# Patient Record
Sex: MALE | Race: WHITE | NOT HISPANIC OR LATINO | Employment: FULL TIME | URBAN - METROPOLITAN AREA
[De-identification: names, ages, dates, MRNs, and addresses within clinical notes are randomized per-mention and may not be internally consistent; named-entity substitution may affect disease eponyms.]

---

## 2023-03-10 LAB — SARS-COV-2 RESULT: NOT DETECTED

## 2023-09-19 LAB — SARS-COV-2 RESULT: DETECTED

## 2023-10-05 ENCOUNTER — ANCILLARY PROCEDURE (OUTPATIENT)
Dept: RADIOLOGY | Facility: CLINIC | Age: 33
End: 2023-10-05
Payer: COMMERCIAL

## 2023-10-05 DIAGNOSIS — I26.99 PULMONARY EMBOLISM (MULTI): ICD-10-CM

## 2023-10-05 PROCEDURE — 71275 CT ANGIOGRAPHY CHEST: CPT | Performed by: RADIOLOGY

## 2023-10-05 PROCEDURE — 71275 CT ANGIOGRAPHY CHEST: CPT

## 2023-10-05 PROCEDURE — 2550000001 HC RX 255 CONTRASTS: Performed by: PHYSICIAN ASSISTANT

## 2023-10-05 RX ADMIN — IOHEXOL 90 ML: 350 INJECTION, SOLUTION INTRAVENOUS at 11:46

## 2024-01-16 ASSESSMENT — PROMIS GLOBAL HEALTH SCALE
RATE_PHYSICAL_HEALTH: POOR
RATE_GENERAL_HEALTH: FAIR
CARRYOUT_SOCIAL_ACTIVITIES: GOOD
CARRYOUT_PHYSICAL_ACTIVITIES: COMPLETELY
RATE_AVERAGE_PAIN: 3
EMOTIONAL_PROBLEMS: OFTEN
RATE_MENTAL_HEALTH: FAIR
RATE_SOCIAL_SATISFACTION: GOOD
RATE_AVERAGE_FATIGUE: MODERATE
RATE_QUALITY_OF_LIFE: GOOD

## 2024-01-18 ENCOUNTER — LAB (OUTPATIENT)
Dept: LAB | Facility: LAB | Age: 34
End: 2024-01-18
Payer: COMMERCIAL

## 2024-01-18 ENCOUNTER — OFFICE VISIT (OUTPATIENT)
Dept: PRIMARY CARE | Facility: CLINIC | Age: 34
End: 2024-01-18
Payer: COMMERCIAL

## 2024-01-18 VITALS
HEART RATE: 96 BPM | BODY MASS INDEX: 39.17 KG/M2 | WEIGHT: 315 LBS | TEMPERATURE: 97.2 F | DIASTOLIC BLOOD PRESSURE: 82 MMHG | SYSTOLIC BLOOD PRESSURE: 128 MMHG | RESPIRATION RATE: 18 BRPM | HEIGHT: 75 IN

## 2024-01-18 DIAGNOSIS — E66.01 CLASS 3 SEVERE OBESITY DUE TO EXCESS CALORIES WITHOUT SERIOUS COMORBIDITY WITH BODY MASS INDEX (BMI) OF 45.0 TO 49.9 IN ADULT (MULTI): ICD-10-CM

## 2024-01-18 DIAGNOSIS — Z00.00 WELL ADULT EXAM: Primary | ICD-10-CM

## 2024-01-18 DIAGNOSIS — Z00.00 WELL ADULT EXAM: ICD-10-CM

## 2024-01-18 LAB
ALBUMIN SERPL BCP-MCNC: 4.6 G/DL (ref 3.4–5)
ALP SERPL-CCNC: 89 U/L (ref 33–120)
ALT SERPL W P-5'-P-CCNC: 47 U/L (ref 10–52)
ANION GAP SERPL CALC-SCNC: 11 MMOL/L (ref 10–20)
AST SERPL W P-5'-P-CCNC: 27 U/L (ref 9–39)
BASOPHILS # BLD AUTO: 0.03 X10*3/UL (ref 0–0.1)
BASOPHILS NFR BLD AUTO: 0.5 %
BILIRUB SERPL-MCNC: 0.7 MG/DL (ref 0–1.2)
BUN SERPL-MCNC: 13 MG/DL (ref 6–23)
CALCIUM SERPL-MCNC: 9.2 MG/DL (ref 8.6–10.3)
CHLORIDE SERPL-SCNC: 102 MMOL/L (ref 98–107)
CHOLEST SERPL-MCNC: 212 MG/DL (ref 0–199)
CHOLESTEROL/HDL RATIO: 6.6
CO2 SERPL-SCNC: 29 MMOL/L (ref 21–32)
CREAT SERPL-MCNC: 0.95 MG/DL (ref 0.5–1.3)
EGFRCR SERPLBLD CKD-EPI 2021: >90 ML/MIN/1.73M*2
EOSINOPHIL # BLD AUTO: 0.07 X10*3/UL (ref 0–0.7)
EOSINOPHIL NFR BLD AUTO: 1.2 %
ERYTHROCYTE [DISTWIDTH] IN BLOOD BY AUTOMATED COUNT: 12.5 % (ref 11.5–14.5)
EST. AVERAGE GLUCOSE BLD GHB EST-MCNC: 100 MG/DL
GLUCOSE SERPL-MCNC: 102 MG/DL (ref 74–99)
HBA1C MFR BLD: 5.1 %
HCT VFR BLD AUTO: 44.8 % (ref 41–52)
HDLC SERPL-MCNC: 32.3 MG/DL
HGB BLD-MCNC: 15.6 G/DL (ref 13.5–17.5)
IMM GRANULOCYTES # BLD AUTO: 0.02 X10*3/UL (ref 0–0.7)
IMM GRANULOCYTES NFR BLD AUTO: 0.4 % (ref 0–0.9)
INSULIN P FAST SERPL-ACNC: 32 UIU/ML (ref 3–25)
LDLC SERPL CALC-MCNC: 145 MG/DL
LYMPHOCYTES # BLD AUTO: 1.38 X10*3/UL (ref 1.2–4.8)
LYMPHOCYTES NFR BLD AUTO: 24.6 %
MCH RBC QN AUTO: 30.6 PG (ref 26–34)
MCHC RBC AUTO-ENTMCNC: 34.8 G/DL (ref 32–36)
MCV RBC AUTO: 88 FL (ref 80–100)
MONOCYTES # BLD AUTO: 0.45 X10*3/UL (ref 0.1–1)
MONOCYTES NFR BLD AUTO: 8 %
NEUTROPHILS # BLD AUTO: 3.66 X10*3/UL (ref 1.2–7.7)
NEUTROPHILS NFR BLD AUTO: 65.3 %
NON HDL CHOLESTEROL: 180 MG/DL (ref 0–149)
NRBC BLD-RTO: 0 /100 WBCS (ref 0–0)
PLATELET # BLD AUTO: 239 X10*3/UL (ref 150–450)
POTASSIUM SERPL-SCNC: 4.2 MMOL/L (ref 3.5–5.3)
PROT SERPL-MCNC: 7.3 G/DL (ref 6.4–8.2)
RBC # BLD AUTO: 5.1 X10*6/UL (ref 4.5–5.9)
SODIUM SERPL-SCNC: 138 MMOL/L (ref 136–145)
T4 FREE SERPL-MCNC: 1.07 NG/DL (ref 0.61–1.12)
TRIGL SERPL-MCNC: 175 MG/DL (ref 0–149)
TSH SERPL-ACNC: 1.26 MIU/L (ref 0.44–3.98)
VLDL: 35 MG/DL (ref 0–40)
WBC # BLD AUTO: 5.6 X10*3/UL (ref 4.4–11.3)

## 2024-01-18 PROCEDURE — 80061 LIPID PANEL: CPT

## 2024-01-18 PROCEDURE — 83525 ASSAY OF INSULIN: CPT

## 2024-01-18 PROCEDURE — 83036 HEMOGLOBIN GLYCOSYLATED A1C: CPT

## 2024-01-18 PROCEDURE — 99395 PREV VISIT EST AGE 18-39: CPT | Performed by: FAMILY MEDICINE

## 2024-01-18 PROCEDURE — 84443 ASSAY THYROID STIM HORMONE: CPT

## 2024-01-18 PROCEDURE — 36415 COLL VENOUS BLD VENIPUNCTURE: CPT

## 2024-01-18 PROCEDURE — 80053 COMPREHEN METABOLIC PANEL: CPT

## 2024-01-18 PROCEDURE — 84439 ASSAY OF FREE THYROXINE: CPT

## 2024-01-18 PROCEDURE — 85025 COMPLETE CBC W/AUTO DIFF WBC: CPT

## 2024-01-18 ASSESSMENT — ENCOUNTER SYMPTOMS
CARDIOVASCULAR NEGATIVE: 1
GASTROINTESTINAL NEGATIVE: 1
ENDOCRINE NEGATIVE: 1
HEMATOLOGIC/LYMPHATIC NEGATIVE: 1
ALLERGIC/IMMUNOLOGIC NEGATIVE: 1
ACTIVITY CHANGE: 1
UNEXPECTED WEIGHT CHANGE: 1
APPETITE CHANGE: 1
PSYCHIATRIC NEGATIVE: 1
MUSCULOSKELETAL NEGATIVE: 1
RESPIRATORY NEGATIVE: 1
NEUROLOGICAL NEGATIVE: 1
EYES NEGATIVE: 1

## 2024-01-18 NOTE — PROGRESS NOTES
"Subjective   Patient ID: Paramjit Abad is a 33 y.o. male who presents for Annual Exam.    HPI     Review of Systems   Constitutional:  Positive for activity change, appetite change and unexpected weight change.   HENT: Negative.     Eyes: Negative.    Respiratory: Negative.     Cardiovascular: Negative.    Gastrointestinal: Negative.    Endocrine: Negative.    Genitourinary: Negative.    Musculoskeletal: Negative.    Skin: Negative.    Allergic/Immunologic: Negative.    Neurological: Negative.    Hematological: Negative.    Psychiatric/Behavioral: Negative.         Objective   /82   Pulse 96   Temp 36.2 °C (97.2 °F)   Resp 18   Ht 1.905 m (6' 3\")   Wt (!) 176 kg (388 lb)   BMI 48.50 kg/m²     Physical Exam  Vitals and nursing note reviewed.   Constitutional:       Appearance: Normal appearance. He is obese.   HENT:      Head: Normocephalic and atraumatic.      Nose: Nose normal.      Mouth/Throat:      Pharynx: Oropharynx is clear.   Eyes:      Extraocular Movements: Extraocular movements intact.      Conjunctiva/sclera: Conjunctivae normal.      Pupils: Pupils are equal, round, and reactive to light.   Neck:      Vascular: No carotid bruit.   Cardiovascular:      Rate and Rhythm: Normal rate and regular rhythm.      Pulses: Normal pulses.      Heart sounds: Normal heart sounds.   Pulmonary:      Effort: Pulmonary effort is normal. No respiratory distress.      Breath sounds: Normal breath sounds. No wheezing, rhonchi or rales.   Abdominal:      General: Abdomen is flat. Bowel sounds are normal. There is no distension.      Palpations: Abdomen is soft.      Tenderness: There is no abdominal tenderness.      Hernia: No hernia is present.   Musculoskeletal:         General: No swelling or tenderness. Normal range of motion.      Cervical back: Normal range of motion and neck supple. No tenderness.   Lymphadenopathy:      Cervical: No cervical adenopathy.   Skin:     General: Skin is warm and dry.      " Capillary Refill: Capillary refill takes less than 2 seconds.   Neurological:      General: No focal deficit present.      Mental Status: He is alert and oriented to person, place, and time.      Cranial Nerves: No cranial nerve deficit.   Psychiatric:         Attention and Perception: Attention and perception normal.         Mood and Affect: Mood normal.         Behavior: Behavior normal.         Thought Content: Thought content normal.         Judgment: Judgment normal.         Assessment/Plan   Problem List Items Addressed This Visit    None  Visit Diagnoses         Codes    Well adult exam    -  Primary Z00.00    Relevant Orders    Insulin, Fasting    Hemoglobin A1C    CBC and Auto Differential    Comprehensive Metabolic Panel    Lipid Panel    Thyroid Stimulating Hormone    T4, free    Class 3 severe obesity due to excess calories without serious comorbidity with body mass index (BMI) of 45.0 to 49.9 in adult (CMS/[facility])     E66.01, Z68.42    Relevant Orders    Insulin, Fasting    Hemoglobin A1C    CBC and Auto Differential    Comprehensive Metabolic Panel    Lipid Panel    Thyroid Stimulating Hormone    T4, free             Patient reported health Fair    Regular Dental Visits no    Regular Eye Visits no    Hearing Loss no    Balanced Diet no    Weight Concerns yes    Exercise Irregular

## 2024-01-19 ENCOUNTER — DOCUMENTATION (OUTPATIENT)
Dept: INPATIENT UNIT | Facility: HOSPITAL | Age: 34
End: 2024-01-19
Payer: COMMERCIAL

## 2024-01-19 DIAGNOSIS — R73.9 HYPERGLYCEMIA: Primary | ICD-10-CM

## 2024-01-19 DIAGNOSIS — E16.1 HYPERINSULINEMIA: ICD-10-CM

## 2024-01-19 DIAGNOSIS — R73.09 IMPAIRED GLUCOSE METABOLISM: ICD-10-CM

## 2024-01-23 ENCOUNTER — TELEPHONE (OUTPATIENT)
Dept: PRIMARY CARE | Facility: CLINIC | Age: 34
End: 2024-01-23
Payer: COMMERCIAL

## 2024-01-23 DIAGNOSIS — E66.01 CLASS 3 SEVERE OBESITY DUE TO EXCESS CALORIES WITHOUT SERIOUS COMORBIDITY WITH BODY MASS INDEX (BMI) OF 45.0 TO 49.9 IN ADULT (MULTI): Primary | ICD-10-CM

## 2024-01-23 RX ORDER — SEMAGLUTIDE 0.25 MG/.5ML
0.25 INJECTION, SOLUTION SUBCUTANEOUS
Qty: 2 ML | Refills: 0 | Status: SHIPPED | OUTPATIENT
Start: 2024-01-23 | End: 2024-02-14

## 2024-01-23 NOTE — TELEPHONE ENCOUNTER
Patient called in stating that his insurance will not cover the Ozempic due to the diagnosis. Is there another Rx he could try or something we are able to do to help this patient?

## 2024-01-25 DIAGNOSIS — F41.1 GAD (GENERALIZED ANXIETY DISORDER): ICD-10-CM

## 2024-01-25 RX ORDER — ESCITALOPRAM OXALATE 5 MG/1
5 TABLET ORAL NIGHTLY
Qty: 90 TABLET | Refills: 1 | Status: SHIPPED | OUTPATIENT
Start: 2024-01-25

## 2024-02-09 ENCOUNTER — TELEPHONE (OUTPATIENT)
Dept: PRIMARY CARE | Facility: CLINIC | Age: 34
End: 2024-02-09
Payer: COMMERCIAL

## 2024-02-09 DIAGNOSIS — E66.01 CLASS 3 SEVERE OBESITY DUE TO EXCESS CALORIES WITHOUT SERIOUS COMORBIDITY WITH BODY MASS INDEX (BMI) OF 45.0 TO 49.9 IN ADULT (MULTI): Primary | ICD-10-CM

## 2024-02-09 NOTE — TELEPHONE ENCOUNTER
----- Message from Fei Geiger DO sent at 2/9/2024  3:55 PM EST -----  See message  ----- Message -----  From: Fei Geiger DO  Sent: 2/8/2024  12:00 AM EST  To: Fei Geiger, DO    How is pt tolerating the 0.25mg dose of ozempic?

## 2024-02-12 NOTE — TELEPHONE ENCOUNTER
Patient returned call and stated that insurance does not cover Wegovy or Ozempic.  insurance does not cover  injectable medications for weight loss. Patient has not been able to start the medication due to this.

## 2024-02-18 ENCOUNTER — OFFICE VISIT (OUTPATIENT)
Dept: URGENT CARE | Facility: CLINIC | Age: 34
End: 2024-02-18
Payer: COMMERCIAL

## 2024-02-18 VITALS
HEIGHT: 76 IN | DIASTOLIC BLOOD PRESSURE: 89 MMHG | RESPIRATION RATE: 18 BRPM | SYSTOLIC BLOOD PRESSURE: 145 MMHG | TEMPERATURE: 98.1 F | BODY MASS INDEX: 38.36 KG/M2 | WEIGHT: 315 LBS | OXYGEN SATURATION: 94 % | HEART RATE: 107 BPM

## 2024-02-18 DIAGNOSIS — J02.9 SORE THROAT: ICD-10-CM

## 2024-02-18 DIAGNOSIS — K12.2 UVULITIS: Primary | ICD-10-CM

## 2024-02-18 LAB — POC RAPID STREP: NEGATIVE

## 2024-02-18 PROCEDURE — 3008F BODY MASS INDEX DOCD: CPT | Performed by: PHYSICIAN ASSISTANT

## 2024-02-18 PROCEDURE — 99204 OFFICE O/P NEW MOD 45 MIN: CPT | Performed by: PHYSICIAN ASSISTANT

## 2024-02-18 PROCEDURE — 1036F TOBACCO NON-USER: CPT | Performed by: PHYSICIAN ASSISTANT

## 2024-02-18 PROCEDURE — 87880 STREP A ASSAY W/OPTIC: CPT | Performed by: PHYSICIAN ASSISTANT

## 2024-02-18 RX ORDER — AMOXICILLIN AND CLAVULANATE POTASSIUM 875; 125 MG/1; MG/1
1 TABLET, FILM COATED ORAL 2 TIMES DAILY
Qty: 20 TABLET | Refills: 0 | Status: SHIPPED | OUTPATIENT
Start: 2024-02-18 | End: 2024-02-28

## 2024-02-18 RX ORDER — PREDNISONE 10 MG/1
50 TABLET ORAL DAILY
Qty: 25 TABLET | Refills: 0 | Status: SHIPPED | OUTPATIENT
Start: 2024-02-18 | End: 2024-02-23

## 2024-02-18 ASSESSMENT — PAIN SCALES - GENERAL: PAINLEVEL: 10-WORST PAIN EVER

## 2024-02-18 NOTE — PATIENT INSTRUCTIONS
Assessment/Plan   Problem List Items Addressed This Visit       Sore throat    Relevant Orders    POCT rapid strep A manually resulted (Completed)    Uvulitis - Primary    Relevant Medications    predniSONE (Deltasone) 10 mg tablet    amoxicillin-pot clavulanate (Augmentin) 875-125 mg tablet      -Patient with pronounced edema of the uvula and sending prednisone as well as Augmentin for coverage.  At time of exam no evidence of peritonsillar abscess though I cannot rule out early development of phlegmon.  Augmentin chosen for expanded coverage

## 2024-02-18 NOTE — PROGRESS NOTES
"Subjective   Patient ID: Paramjit Abad is a 33 y.o. male who presents for Sore Throat (Swollen uvula ; started last night ) and Nasal Congestion.  Patient notes sore throat starting last night and sensation of uvula being swollen.  He denies any difficulty breathing but does note that he feels the uvula anytime that he swallows.  He has a history of uvulitis in the past.  He denies any fevers or chills but he is noted to be mildly tachycardic at time of exam.  He denies any nausea vomiting diarrhea or abdominal pain chest pain or shortness of breath    Past Medical History:   Diagnosis Date    Personal history of other infectious and parasitic diseases 12/01/2019    History of viral gastroenteritis    Radiculopathy, lumbar region 07/29/2019    Lumbar radiculopathy, acute         The remainder of the systems were reviewed and are negative unless noted above      Objective   /89 (BP Location: Left arm, Patient Position: Sitting, BP Cuff Size: Large adult)   Pulse 107   Temp 36.7 °C (98.1 °F) (Temporal)   Resp 18   Ht 1.93 m (6' 4\")   Wt (!) 172 kg (380 lb)   SpO2 94%   BMI 46.26 kg/m²   Physical Exam  Vitals reviewed.   Constitutional:       General: He is not in acute distress.     Appearance: Normal appearance. He is not toxic-appearing.   HENT:      Head: Normocephalic.      Right Ear: Tympanic membrane and ear canal normal. No tenderness. No mastoid tenderness.      Left Ear: Tympanic membrane and ear canal normal. No tenderness. No mastoid tenderness.      Nose: No congestion or rhinorrhea.      Mouth/Throat:      Mouth: Mucous membranes are moist.      Pharynx: Oropharynx is clear. Posterior oropharyngeal erythema present.      Comments: Edematous erythematous uvula.  Notable erythema of the posterior oropharynx and soft palate  Eyes:      Conjunctiva/sclera: Conjunctivae normal.      Pupils: Pupils are equal, round, and reactive to light.   Cardiovascular:      Rate and Rhythm: Normal rate and regular " rhythm.      Heart sounds: No murmur heard.  Pulmonary:      Effort: No respiratory distress.      Breath sounds: No stridor. No wheezing, rhonchi or rales.   Chest:      Chest wall: No tenderness.   Musculoskeletal:         General: Normal range of motion.   Skin:     General: Skin is warm and dry.      Capillary Refill: Capillary refill takes less than 2 seconds.      Findings: No erythema.   Neurological:      General: No focal deficit present.      Mental Status: He is alert.         Assessment/Plan   Problem List Items Addressed This Visit       Sore throat    Relevant Orders    POCT rapid strep A manually resulted (Completed)    Uvulitis - Primary    Relevant Medications    predniSONE (Deltasone) 10 mg tablet    amoxicillin-pot clavulanate (Augmentin) 875-125 mg tablet      -Patient with pronounced edema of the uvula and sending prednisone as well as Augmentin for coverage.  At time of exam no evidence of peritonsillar abscess though I cannot rule out early development of phlegmon.  Augmentin chosen for expanded coverage

## 2025-04-14 NOTE — PROGRESS NOTES
Subjective   Patient ID: Paramjit Abad is a 34 y.o. male    HPI  34 y.o. male who presents to [facility] for a fertility evaluation. He and his wife aged 30, have been trying to conceive for the past year. He expresses having conception once, however miscarried at 10 weeks. Wife does use Rickey to track ovulation. Varicocele noted during physical exam today. Otherwise normal physical exam.      Review of Systems    All systems were reviewed. Anything negative was noted in the HPI.    Objective   Physical Exam    General: Well developed, well nourished, alert and cooperative, appears in no acute distress   Eyes: Non-injected conjunctiva, sclera clear, no proptosis   Cardiac: Extremities are warm and well perfused. No edema, cyanosis or pallor   Lungs: Breathing is easy, non-labored. Speaking in clear and complete sentences. Normal diaphragmatic movement   MSK: Ambulatory with steady gait, unassisted   Neuro: Alert and oriented to person, place, and time   Psych: Demonstrates good judgment and reason, without hallucinations, abnormal affect or abnormal behaviors   Skin: No obvious lesions, no rashes       No CVA tenderness bilaterally   No suprapubic pain or discomfort       Past Medical History:   Diagnosis Date    Personal history of other infectious and parasitic diseases 12/01/2019    History of viral gastroenteritis    Radiculopathy, lumbar region 07/29/2019    Lumbar radiculopathy, acute         No past surgical history on file.      Assessment/Plan   Fertility consultation    34 y.o. male who presents for the above condition, We had a very long and extensive discussion with the patient regarding the pathophysiology, differential diagnosis, risk factor, management, natural history, incidence and diagnostic work-up of the condition.  Explained to him that semen analysis are highly variable test, and that his results and the role of total motile sperm which is the best indicator for successful fertility potential using a  natural conception. I explained to him all the lifestyle modification that could improve the semen parameters including refraining from smoking alcohol, healthy eating sleeping and exercise, losing some weight, refrain from anything that would increase the temperature of the scrotum such as hot tubs, Jacuzzi, saunas, or tight underwear. We discussed the optimal fertile window during which they should have intercourse and I instructed him to abstain for 4 days before semen analysis. He verbalized understanding and would like to proceed.     - Educated about optimal timing for conception  - Advised semen analysis as a diagnostic tool to evaluate fertility     Plan:  - Semen Analysis  - Follow-up with results    E&M visit today is associated with current or anticipated ongoing medical care services related to a patient's single, serious condition or a complex condition.     4/15/2025    Scribe Attestation  By signing my name below, IBalaji Scribe attest that this documentation has been prepared under the direction and in the presence of Dr. Shelton Ocampo.

## 2025-04-15 ENCOUNTER — OFFICE VISIT (OUTPATIENT)
Dept: UROLOGY | Facility: HOSPITAL | Age: 35
End: 2025-04-15
Payer: COMMERCIAL

## 2025-04-15 DIAGNOSIS — Z31.41 FERTILITY TESTING: Primary | ICD-10-CM

## 2025-04-15 PROCEDURE — 99214 OFFICE O/P EST MOD 30 MIN: CPT | Performed by: STUDENT IN AN ORGANIZED HEALTH CARE EDUCATION/TRAINING PROGRAM

## 2025-04-15 PROCEDURE — 99204 OFFICE O/P NEW MOD 45 MIN: CPT | Performed by: STUDENT IN AN ORGANIZED HEALTH CARE EDUCATION/TRAINING PROGRAM

## 2025-05-20 NOTE — PROGRESS NOTES
Subjective   Patient ID: Paramjit Abad is a 34 y.o. male    HPI  34 y.o. male who presents for a 1 month follow-up visit regarding his fertility. He and his wife aged 30, have been trying to conceive for the past year. He expresses having conception once, however miscarried at 10 weeks. Wife does use Rickey to track ovulation. Varicocele noted during physical exam today. Otherwise normal physical exam.    Today, he ***      Review of Systems    All systems were reviewed. Anything negative was noted in the HPI.    Objective   Physical Exam    General: Well developed, well nourished, alert and cooperative, appears in no acute distress   Eyes: Non-injected conjunctiva, sclera clear, no proptosis   Cardiac: Extremities are warm and well perfused. No edema, cyanosis or pallor   Lungs: Breathing is easy, non-labored. Speaking in clear and complete sentences. Normal diaphragmatic movement   MSK: Ambulatory with steady gait, unassisted   Neuro: Alert and oriented to person, place, and time   Psych: Demonstrates good judgment and reason, without hallucinations, abnormal affect or abnormal behaviors   Skin: No obvious lesions, no rashes       No CVA tenderness bilaterally   No suprapubic pain or discomfort       Past Medical History:   Diagnosis Date    Personal history of other infectious and parasitic diseases 12/01/2019    History of viral gastroenteritis    Radiculopathy, lumbar region 07/29/2019    Lumbar radiculopathy, acute         No past surgical history on file.      Assessment/Plan   Fertility consultation    34 y.o. male who presents for the above condition, We had a very long and extensive discussion with the patient regarding the pathophysiology, differential diagnosis, risk factor, management, natural history, incidence and diagnostic work-up of the condition.  Explained to him that semen analysis are highly variable test, and that his results and the role of total motile sperm which is the best indicator for  successful fertility potential using a natural conception. I explained to him all the lifestyle modification that could improve the semen parameters including refraining from smoking alcohol, healthy eating sleeping and exercise, losing some weight, refrain from anything that would increase the temperature of the scrotum such as hot tubs, Jacuzzi, saunas, or tight underwear. We discussed the optimal fertile window during which they should have intercourse and I instructed him to abstain for 4 days before semen analysis. He verbalized understanding and would like to proceed.     - Educated about optimal timing for conception  - Advised semen analysis as a diagnostic tool to evaluate fertility     Plan:  - ***        E&M visit today is associated with current or anticipated ongoing medical care services related to a patient's single, serious condition or a complex condition.     5/21/2025    Scribe Attestation  By signing my name below, Balaji TRINH Scribe attest that this documentation has been prepared under the direction and in the presence of Dr. Shelton Ocampo.

## 2025-05-21 ENCOUNTER — APPOINTMENT (OUTPATIENT)
Dept: UROLOGY | Facility: HOSPITAL | Age: 35
End: 2025-05-21
Payer: COMMERCIAL

## 2025-06-03 ENCOUNTER — APPOINTMENT (OUTPATIENT)
Dept: ENDOCRINOLOGY | Facility: CLINIC | Age: 35
End: 2025-06-03
Payer: COMMERCIAL

## 2025-06-06 ENCOUNTER — PATIENT MESSAGE (OUTPATIENT)
Dept: UROLOGY | Facility: HOSPITAL | Age: 35
End: 2025-06-06
Payer: COMMERCIAL

## 2025-08-04 ENCOUNTER — ANCILLARY PROCEDURE (OUTPATIENT)
Dept: ENDOCRINOLOGY | Facility: CLINIC | Age: 35
End: 2025-08-04
Payer: COMMERCIAL

## 2025-08-04 DIAGNOSIS — Z31.41 FERTILITY TESTING: ICD-10-CM

## 2025-08-04 LAB
% EX RESIDUAL CYTOPLASM (SEMEN): 0 %
% HEAD DEFECTS (SEMEN): 89.5 %
% NECK MIDPIECE (SEMEN): 23.5 %
% NORMAL (SEMEN): 4 % (ref 4–?)
% TAIL DEFECTS (SEMEN): 1.8 %
ABSTINENCE (DAYS): 2 DAYS (ref 2–7)
AGGLUTINATION (SEMEN): NO
ANALYZED TIME:: ABNORMAL
ANDROLOGY LAB ID#: ABNORMAL
CLUMPS (SEMEN): YES
COLLECTED COMPLETELY: YES
COLLECTION LOCATION:: ABNORMAL
COLLECTION METHOD:: ABNORMAL
CONCENTRATION(SEMEN): 6.84 MILL/ML (ref 15–?)
DEBRIS (SEMEN): YES
LEUKOCYTE (SEMEN): ABNORMAL
NON PROG. MOTILITY (SEMEN): 5 %
PROG. MOTILITY (SEMEN): 56 % (ref 32–?)
RECEIVED TIME:: ABNORMAL
REI PARTNER DOB: ABNORMAL
REI PARTNER NAME: ABNORMAL
SEMEN APPEARANCE: NORMAL
SEMEN LIQUEFACTION: NORMAL
SEMEN VISCOSITY: NORMAL
TOT. NO OF NORM. MOTILE SPERM (SEMEN): 0.48 MILL
TOT. NO OF NORM. SPERM (SEMEN): 0.79 MILL
TOTAL MOTILITY (SEMEN): 61 % (ref 40–?)
TOTAL NO OF MOTILE (SEMEN): 12.05 MILL
TOTAL NO OF RND CELLS (SEMEN): 0.3 MILL (ref ?–5)
TOTAL NO OF SPERM (SEMEN): 19.83 MILL (ref 39–?)
VOLUME (SEMEN): 2.9 ML (ref 1.5–?)

## 2025-08-04 PROCEDURE — 89322 SEMEN ANAL STRICT CRITERIA: CPT | Performed by: OBSTETRICS & GYNECOLOGY

## 2025-09-25 ENCOUNTER — APPOINTMENT (OUTPATIENT)
Dept: UROLOGY | Facility: HOSPITAL | Age: 35
End: 2025-09-25
Payer: COMMERCIAL

## 2025-12-11 ENCOUNTER — APPOINTMENT (OUTPATIENT)
Dept: ENDOCRINOLOGY | Facility: CLINIC | Age: 35
End: 2025-12-11
Payer: COMMERCIAL

## 2026-01-22 ENCOUNTER — APPOINTMENT (OUTPATIENT)
Dept: ENDOCRINOLOGY | Facility: CLINIC | Age: 36
End: 2026-01-22
Payer: COMMERCIAL